# Patient Record
Sex: FEMALE | NOT HISPANIC OR LATINO
[De-identification: names, ages, dates, MRNs, and addresses within clinical notes are randomized per-mention and may not be internally consistent; named-entity substitution may affect disease eponyms.]

---

## 2017-04-06 ENCOUNTER — RESULT REVIEW (OUTPATIENT)
Age: 22
End: 2017-04-06

## 2018-04-25 ENCOUNTER — RESULT REVIEW (OUTPATIENT)
Age: 23
End: 2018-04-25

## 2020-03-16 ENCOUNTER — EMERGENCY (EMERGENCY)
Facility: HOSPITAL | Age: 25
LOS: 1 days | Discharge: HOME | End: 2020-03-16
Attending: EMERGENCY MEDICINE
Payer: COMMERCIAL

## 2020-03-16 VITALS
DIASTOLIC BLOOD PRESSURE: 73 MMHG | SYSTOLIC BLOOD PRESSURE: 117 MMHG | HEART RATE: 76 BPM | TEMPERATURE: 98 F | WEIGHT: 145.06 LBS | RESPIRATION RATE: 19 BRPM | OXYGEN SATURATION: 98 %

## 2020-03-16 VITALS
OXYGEN SATURATION: 99 % | TEMPERATURE: 99 F | DIASTOLIC BLOOD PRESSURE: 76 MMHG | RESPIRATION RATE: 16 BRPM | SYSTOLIC BLOOD PRESSURE: 110 MMHG | HEART RATE: 72 BPM

## 2020-03-16 LAB
RAPID RVP RESULT: DETECTED
RV+EV RNA SPEC QL NAA+PROBE: DETECTED

## 2020-03-16 PROCEDURE — 87581 M.PNEUMON DNA AMP PROBE: CPT

## 2020-03-16 PROCEDURE — 99283 EMERGENCY DEPT VISIT LOW MDM: CPT

## 2020-03-16 PROCEDURE — 87486 CHLMYD PNEUM DNA AMP PROBE: CPT

## 2020-03-16 PROCEDURE — 87633 RESP VIRUS 12-25 TARGETS: CPT

## 2020-03-16 PROCEDURE — 87798 DETECT AGENT NOS DNA AMP: CPT

## 2020-03-16 NOTE — ED ADULT NURSE NOTE - CHPI ED NUR SYMPTOMS NEG
no decreased eating/drinking/no dizziness/no nausea/no pain/no tingling/no vomiting/no weakness/no chills

## 2020-03-16 NOTE — ED PROVIDER NOTE - NS_ ATTENDINGSCRIBEDETAILS _ED_A_ED_FT
I performed a history and physical exam of the patient and discussed their management with the resident. I reviewed the scribe's note and agree with the documented findings and plan of care.  Wendi La MD I reviewed the scribe's note and agree with the documented findings and plan of care.  Wendi La MD

## 2020-03-16 NOTE — ED PROVIDER NOTE - CLINICAL SUMMARY MEDICAL DECISION MAKING FREE TEXT BOX
Most likely viral illness. Will continue standard care protocols. Does not need isolation. Does not meet COVID-19 discharge.

## 2020-03-16 NOTE — ED ADULT NURSE NOTE - OBJECTIVE STATEMENT
Pt is a 23 yo F who came tot  Ed amb c/o non productive cough, and runny nose x 2 days. Yesterday she developed a temp of 99.9 F. 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 Denies sick contacts. Denies recent travel. A/O x3. no CP/sob/palpitations, no abd pain n/v/d, no ha/dizziness/lightheadedness.

## 2020-03-16 NOTE — ED PROVIDER NOTE - PATIENT PORTAL LINK FT
You can access the FollowMyHealth Patient Portal offered by [facility] by registering at the following website: [email or web address]. By joining [facility] [employer]’s FollowMyHealth portal, you will also be able to view your health information using other applications (apps) compatible with our system.

## 2020-03-16 NOTE — ED PROVIDER NOTE - OBJECTIVE STATEMENT
24 year old F with no significant PMHx or significant PSHx presents to ED c/o  cough, and runny nose x2 days. Last night felt warm with temp of 99F. 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 Denies sick contacts. Denies recent travel. No known COVID-19 contacts. No fever over 100.3F, chills

## 2020-03-17 NOTE — ED POST DISCHARGE NOTE - DETAILS
3/17/20: pt contacted, explained test results. Advised pt to stay home until 24hrs afebrile without antipyretics. Counseled on hygiene. Gave return precautions and followup instructions. Pt verbalized understanding, was appreciative of the call. - Oleg Cummins PA-C

## 2020-04-25 ENCOUNTER — MESSAGE (OUTPATIENT)
Age: 25
End: 2020-04-25

## 2020-05-06 LAB
SARS-COV-2 IGG SERPL IA-ACNC: 0.3 RATIO
SARS-COV-2 IGG SERPL QL IA: NEGATIVE

## 2023-08-28 NOTE — ED ADULT NURSE NOTE - NS ED NURSE RECORD ANOTHER VITAL SIGN
Patient to ED c/o shortness of breath x 2 weeks that has been worse over the past couple of days. Patient reports she called Dr. Ogden but could not be seen for a while. Patient took her Albuterol and Symbicort this morning with no relief.    Yes